# Patient Record
Sex: FEMALE | Race: WHITE | NOT HISPANIC OR LATINO | Employment: UNEMPLOYED | URBAN - METROPOLITAN AREA
[De-identification: names, ages, dates, MRNs, and addresses within clinical notes are randomized per-mention and may not be internally consistent; named-entity substitution may affect disease eponyms.]

---

## 2018-08-08 DIAGNOSIS — Z12.39 SCREENING BREAST EXAMINATION: Primary | ICD-10-CM

## 2018-10-05 ENCOUNTER — HOSPITAL ENCOUNTER (OUTPATIENT)
Dept: RADIOLOGY | Facility: HOSPITAL | Age: 48
Discharge: HOME | End: 2018-10-05
Attending: NURSE PRACTITIONER
Payer: COMMERCIAL

## 2018-10-05 DIAGNOSIS — Z12.39 SCREENING BREAST EXAMINATION: ICD-10-CM

## 2018-10-05 PROCEDURE — 77067 SCR MAMMO BI INCL CAD: CPT | Mod: 26,,, | Performed by: RADIOLOGY

## 2018-10-05 PROCEDURE — 77067 SCR MAMMO BI INCL CAD: CPT | Mod: TC

## 2019-04-04 DIAGNOSIS — R07.89 OTHER CHEST PAIN: ICD-10-CM

## 2019-04-04 DIAGNOSIS — R74.8 ABNORMAL LEVELS OF OTHER SERUM ENZYMES: Primary | ICD-10-CM

## 2019-09-09 ENCOUNTER — HOSPITAL ENCOUNTER (EMERGENCY)
Facility: HOSPITAL | Age: 49
Discharge: PSYCHIATRIC OR SUBSTANCE USE FACILITY | End: 2019-09-09
Attending: EMERGENCY MEDICINE
Payer: COMMERCIAL

## 2019-09-09 VITALS
HEIGHT: 64 IN | DIASTOLIC BLOOD PRESSURE: 82 MMHG | OXYGEN SATURATION: 99 % | BODY MASS INDEX: 28.17 KG/M2 | RESPIRATION RATE: 11 BRPM | SYSTOLIC BLOOD PRESSURE: 137 MMHG | TEMPERATURE: 98 F | WEIGHT: 165 LBS | HEART RATE: 78 BPM

## 2019-09-09 DIAGNOSIS — R53.1 WEAKNESS: ICD-10-CM

## 2019-09-09 DIAGNOSIS — E86.0 DEHYDRATION: Primary | ICD-10-CM

## 2019-09-09 LAB
ALBUMIN SERPL BCP-MCNC: 4.9 G/DL (ref 3.5–5.2)
ALP SERPL-CCNC: 167 U/L (ref 55–135)
ALT SERPL W/O P-5'-P-CCNC: 213 U/L (ref 10–44)
ANION GAP SERPL CALC-SCNC: 13 MMOL/L (ref 8–16)
AST SERPL-CCNC: 98 U/L (ref 10–40)
BACTERIA #/AREA URNS HPF: NORMAL /HPF
BASOPHILS # BLD AUTO: 0.04 K/UL (ref 0–0.2)
BASOPHILS NFR BLD: 0.5 % (ref 0–1.9)
BILIRUB SERPL-MCNC: 0.9 MG/DL (ref 0.1–1)
BILIRUB UR QL STRIP: NEGATIVE
BUN SERPL-MCNC: 14 MG/DL (ref 6–20)
CALCIUM SERPL-MCNC: 9.5 MG/DL (ref 8.7–10.5)
CHLORIDE SERPL-SCNC: 102 MMOL/L (ref 95–110)
CK SERPL-CCNC: 87 U/L (ref 20–180)
CLARITY UR: CLEAR
CO2 SERPL-SCNC: 26 MMOL/L (ref 23–29)
COLOR UR: YELLOW
CREAT SERPL-MCNC: 0.8 MG/DL (ref 0.5–1.4)
DIFFERENTIAL METHOD: ABNORMAL
EOSINOPHIL # BLD AUTO: 0.1 K/UL (ref 0–0.5)
EOSINOPHIL NFR BLD: 1.5 % (ref 0–8)
ERYTHROCYTE [DISTWIDTH] IN BLOOD BY AUTOMATED COUNT: 12.9 % (ref 11.5–14.5)
EST. GFR  (AFRICAN AMERICAN): >60 ML/MIN/1.73 M^2
EST. GFR  (NON AFRICAN AMERICAN): >60 ML/MIN/1.73 M^2
GLUCOSE SERPL-MCNC: 112 MG/DL (ref 70–110)
GLUCOSE UR QL STRIP: NEGATIVE
HCT VFR BLD AUTO: 41 % (ref 37–48.5)
HGB BLD-MCNC: 13.6 G/DL (ref 12–16)
HGB UR QL STRIP: NEGATIVE
KETONES UR QL STRIP: NEGATIVE
LEUKOCYTE ESTERASE UR QL STRIP: NEGATIVE
LYMPHOCYTES # BLD AUTO: 2.4 K/UL (ref 1–4.8)
LYMPHOCYTES NFR BLD: 30.7 % (ref 18–48)
MCH RBC QN AUTO: 32.3 PG (ref 27–31)
MCHC RBC AUTO-ENTMCNC: 33.2 G/DL (ref 32–36)
MCV RBC AUTO: 97 FL (ref 82–98)
MICROSCOPIC COMMENT: NORMAL
MONOCYTES # BLD AUTO: 0.5 K/UL (ref 0.3–1)
MONOCYTES NFR BLD: 5.9 % (ref 4–15)
NEUTROPHILS # BLD AUTO: 4.9 K/UL (ref 1.8–7.7)
NEUTROPHILS NFR BLD: 61.4 % (ref 38–73)
NITRITE UR QL STRIP: NEGATIVE
PH UR STRIP: 6 [PH] (ref 5–8)
PLATELET # BLD AUTO: 367 K/UL (ref 150–350)
PMV BLD AUTO: 9.3 FL (ref 9.2–12.9)
POTASSIUM SERPL-SCNC: 4.1 MMOL/L (ref 3.5–5.1)
PROT SERPL-MCNC: 8.4 G/DL (ref 6–8.4)
PROT UR QL STRIP: NEGATIVE
RBC # BLD AUTO: 4.21 M/UL (ref 4–5.4)
RBC #/AREA URNS HPF: 0 /HPF (ref 0–4)
SODIUM SERPL-SCNC: 141 MMOL/L (ref 136–145)
SP GR UR STRIP: 1.02 (ref 1–1.03)
TROPONIN I SERPL DL<=0.01 NG/ML-MCNC: <0.006 NG/ML (ref 0–0.03)
TSH SERPL DL<=0.005 MIU/L-ACNC: 0.95 UIU/ML (ref 0.4–4)
URN SPEC COLLECT METH UR: NORMAL
UROBILINOGEN UR STRIP-ACNC: NEGATIVE EU/DL
WBC # BLD AUTO: 7.94 K/UL (ref 3.9–12.7)
WBC #/AREA URNS HPF: 0 /HPF (ref 0–5)

## 2019-09-09 PROCEDURE — 93010 EKG 12-LEAD: ICD-10-PCS | Mod: ,,, | Performed by: INTERNAL MEDICINE

## 2019-09-09 PROCEDURE — 82550 ASSAY OF CK (CPK): CPT

## 2019-09-09 PROCEDURE — 93010 ELECTROCARDIOGRAM REPORT: CPT | Mod: ,,, | Performed by: INTERNAL MEDICINE

## 2019-09-09 PROCEDURE — 84484 ASSAY OF TROPONIN QUANT: CPT

## 2019-09-09 PROCEDURE — 63600175 PHARM REV CODE 636 W HCPCS: Performed by: EMERGENCY MEDICINE

## 2019-09-09 PROCEDURE — 81000 URINALYSIS NONAUTO W/SCOPE: CPT

## 2019-09-09 PROCEDURE — 99284 EMERGENCY DEPT VISIT MOD MDM: CPT | Mod: 25

## 2019-09-09 PROCEDURE — 85025 COMPLETE CBC W/AUTO DIFF WBC: CPT

## 2019-09-09 PROCEDURE — 84443 ASSAY THYROID STIM HORMONE: CPT

## 2019-09-09 PROCEDURE — 93005 ELECTROCARDIOGRAM TRACING: CPT

## 2019-09-09 PROCEDURE — 80053 COMPREHEN METABOLIC PANEL: CPT

## 2019-09-09 RX ADMIN — SODIUM CHLORIDE 1000 ML: 0.9 INJECTION, SOLUTION INTRAVENOUS at 04:09

## 2019-09-09 NOTE — ED PROVIDER NOTES
Encounter Date: 9/9/2019       History     Chief Complaint   Patient presents with    Fatigue     reports diarrhea Saturday; reports weakness and general fatigue; tingling to both arms; general bodyaches; sent from urgent care for /104 and for IV fluids; reports chest pain Saturday     48 y.o. female No past medical history on file.     Notes that she was outside at a very hot football game on Saturday and shortly after started to feel weak, sluggish, dehydrated, notes that she had 4 episodes of loose stools without blood but denies ongoing symptoms. She notes that she did have chest pain for a few min on Saturday which completely resolved with antacids. She was seen at an Urgent care where they redirected here for ivf due to dehydration. She endorses mild nausea.        Review of patient's allergies indicates:  No Known Allergies  No past medical history on file.  Past Surgical History:   Procedure Laterality Date    HYSTERECTOMY      OOPHORECTOMY       No family history on file.  Social History     Tobacco Use    Smoking status: Not on file   Substance Use Topics    Alcohol use: Not on file    Drug use: Not on file     Review of Systems   Constitutional: Negative for fever.   HENT: Negative for sore throat.    Respiratory: Negative for shortness of breath.    Cardiovascular: Negative for chest pain.   Gastrointestinal: Negative for nausea.   Genitourinary: Negative for dysuria.   Musculoskeletal: Negative for back pain.   Skin: Negative for rash.   Neurological: Negative for weakness.   Hematological: Does not bruise/bleed easily.   All other systems reviewed and are negative.      Physical Exam     Initial Vitals [09/09/19 1622]   BP Pulse Resp Temp SpO2   (!) 159/89 (!) 111 20 98.4 °F (36.9 °C) 100 %      MAP       --         Physical Exam    Nursing note and vitals reviewed.  Constitutional: She appears well-developed and well-nourished.   HENT:   Head: Normocephalic and atraumatic.   Eyes:  Conjunctivae and EOM are normal. Pupils are equal, round, and reactive to light.   Neck: Normal range of motion.   Cardiovascular: Regular rhythm.   Pulmonary/Chest: No respiratory distress.   Abdominal: Soft. She exhibits no distension. There is no tenderness. There is no rebound and no guarding.   Musculoskeletal: Normal range of motion.   Neurological: She is alert. No cranial nerve deficit. GCS score is 15. GCS eye subscore is 4. GCS verbal subscore is 5. GCS motor subscore is 6.   Skin: Skin is warm and dry.   Psychiatric: She has a normal mood and affect. Thought content normal.     tachy no m    ED Course   Procedures  Labs Reviewed   CBC W/ AUTO DIFFERENTIAL   COMPREHENSIVE METABOLIC PANEL   URINALYSIS, REFLEX TO URINE CULTURE   TSH   TROPONIN I   CK   POCT URINE PREGNANCY     EKG Readings: (Independently Interpreted)   Hr 98, sinus, nl axis/intervals, twi III, avf, no stemi.        Imaging Results    None          Medical Decision Making:   Initial Assessment:   Dehydration: screening labs, ck, ivf                 pt feels much better after interventions.     Clinical Impression:       ICD-10-CM ICD-9-CM   1. Dehydration E86.0 276.51   2. Weakness R53.1 780.79                                Sabra Coates MD  09/09/19 8030

## 2019-09-09 NOTE — ED TRIAGE NOTES
Pt reports hot sweats for several days. Became over heated on Saturday and had one case of diarrhea. Pt reports weakness and fatigue since then. Pt is alert and oriented, ambulatory. Respirations even, unlabored.

## 2019-09-09 NOTE — DISCHARGE INSTRUCTIONS
Thank you for coming to our Emergency Department today. It is important to remember that some problems are difficult to diagnose and may not be found during your first visit. Be sure to follow up with your primary care doctor and review any labs/imaging that was performed with them. If you do not have a primary care doctor, you may contact the one listed on your discharge paperwork or you may also call the [facility] Appointment Desk at [phone] to schedule an appointment with one.     All medications may potentially have side effects and it is impossible to predict which medications may give you side effects. If you feel that you are having a negative effect of any medication you should immediately stop taking them and seek medical attention.    Return to the ER with any questions/concerns, new/concerning symptoms, worsening or failure to improve. Do not drive or make any important decisions for 24 hours if you have received any pain medications, sedatives or mood altering drugs during your ER visit.

## 2019-10-27 ENCOUNTER — HOSPITAL ENCOUNTER (INPATIENT)
Facility: HOSPITAL | Age: 49
LOS: 2 days | Discharge: HOME | DRG: 195 | End: 2019-10-29
Attending: EMERGENCY MEDICINE | Admitting: HOSPITALIST
Payer: COMMERCIAL

## 2019-10-27 DIAGNOSIS — J18.9 PNEUMONIA OF RIGHT MIDDLE LOBE DUE TO INFECTIOUS ORGANISM: Primary | ICD-10-CM

## 2019-10-27 DIAGNOSIS — R09.02 HYPOXIA: ICD-10-CM

## 2019-10-27 LAB
ALBUMIN SERPL BCP-MCNC: 4.4 G/DL (ref 3.5–5.2)
ALP SERPL-CCNC: 131 U/L (ref 55–135)
ALT SERPL W/O P-5'-P-CCNC: 109 U/L (ref 10–44)
ANION GAP SERPL CALC-SCNC: 12 MMOL/L (ref 8–16)
AST SERPL-CCNC: 93 U/L (ref 10–40)
BASOPHILS # BLD AUTO: 0.02 K/UL (ref 0–0.2)
BASOPHILS NFR BLD: 0.2 % (ref 0–1.9)
BILIRUB SERPL-MCNC: 0.4 MG/DL (ref 0.1–1)
BILIRUB UR QL STRIP: NEGATIVE
BUN SERPL-MCNC: 11 MG/DL (ref 6–20)
CALCIUM SERPL-MCNC: 9.3 MG/DL (ref 8.7–10.5)
CHLORIDE SERPL-SCNC: 106 MMOL/L (ref 95–110)
CLARITY UR: CLEAR
CO2 SERPL-SCNC: 26 MMOL/L (ref 23–29)
COLOR UR: NORMAL
CREAT SERPL-MCNC: 0.7 MG/DL (ref 0.5–1.4)
DIFFERENTIAL METHOD: ABNORMAL
EOSINOPHIL # BLD AUTO: 0 K/UL (ref 0–0.5)
EOSINOPHIL NFR BLD: 0.1 % (ref 0–8)
ERYTHROCYTE [DISTWIDTH] IN BLOOD BY AUTOMATED COUNT: 12.9 % (ref 11.5–14.5)
EST. GFR  (AFRICAN AMERICAN): >60 ML/MIN/1.73 M^2
EST. GFR  (NON AFRICAN AMERICAN): >60 ML/MIN/1.73 M^2
GLUCOSE SERPL-MCNC: 100 MG/DL (ref 70–110)
GLUCOSE UR QL STRIP: NEGATIVE
HCT VFR BLD AUTO: 39.6 % (ref 37–48.5)
HGB BLD-MCNC: 13.1 G/DL (ref 12–16)
HGB UR QL STRIP: NEGATIVE
IMM GRANULOCYTES # BLD AUTO: 0.13 K/UL (ref 0–0.04)
IMM GRANULOCYTES NFR BLD AUTO: 1.1 % (ref 0–0.5)
KETONES UR QL STRIP: NEGATIVE
LACTATE SERPL-SCNC: 1.7 MMOL/L (ref 0.5–2.2)
LACTATE SERPL-SCNC: 3.7 MMOL/L (ref 0.5–2.2)
LEUKOCYTE ESTERASE UR QL STRIP: NEGATIVE
LYMPHOCYTES # BLD AUTO: 2.6 K/UL (ref 1–4.8)
LYMPHOCYTES NFR BLD: 21.9 % (ref 18–48)
MCH RBC QN AUTO: 32.5 PG (ref 27–31)
MCHC RBC AUTO-ENTMCNC: 33.1 G/DL (ref 32–36)
MCV RBC AUTO: 98 FL (ref 82–98)
MONOCYTES # BLD AUTO: 0.6 K/UL (ref 0.3–1)
MONOCYTES NFR BLD: 5.1 % (ref 4–15)
NEUTROPHILS # BLD AUTO: 8.6 K/UL (ref 1.8–7.7)
NEUTROPHILS NFR BLD: 71.6 % (ref 38–73)
NITRITE UR QL STRIP: NEGATIVE
NRBC BLD-RTO: 0 /100 WBC
PH UR STRIP: 6 [PH] (ref 5–8)
PLATELET # BLD AUTO: 331 K/UL (ref 150–350)
PMV BLD AUTO: 9.6 FL (ref 9.2–12.9)
POTASSIUM SERPL-SCNC: 4.2 MMOL/L (ref 3.5–5.1)
PROT SERPL-MCNC: 7.8 G/DL (ref 6–8.4)
PROT UR QL STRIP: NEGATIVE
RBC # BLD AUTO: 4.03 M/UL (ref 4–5.4)
SODIUM SERPL-SCNC: 144 MMOL/L (ref 136–145)
SP GR UR STRIP: 1.01 (ref 1–1.03)
URN SPEC COLLECT METH UR: NORMAL
UROBILINOGEN UR STRIP-ACNC: NEGATIVE EU/DL
WBC # BLD AUTO: 12.03 K/UL (ref 3.9–12.7)

## 2019-10-27 PROCEDURE — 25000003 PHARM REV CODE 250: Performed by: PHYSICIAN ASSISTANT

## 2019-10-27 PROCEDURE — 99285 EMERGENCY DEPT VISIT HI MDM: CPT | Mod: 25

## 2019-10-27 PROCEDURE — 81003 URINALYSIS AUTO W/O SCOPE: CPT

## 2019-10-27 PROCEDURE — 80053 COMPREHEN METABOLIC PANEL: CPT

## 2019-10-27 PROCEDURE — 96365 THER/PROPH/DIAG IV INF INIT: CPT

## 2019-10-27 PROCEDURE — 63600175 PHARM REV CODE 636 W HCPCS: Performed by: PHYSICIAN ASSISTANT

## 2019-10-27 PROCEDURE — 12000002 HC ACUTE/MED SURGE SEMI-PRIVATE ROOM

## 2019-10-27 PROCEDURE — 87040 BLOOD CULTURE FOR BACTERIA: CPT | Mod: 59

## 2019-10-27 PROCEDURE — 96375 TX/PRO/DX INJ NEW DRUG ADDON: CPT

## 2019-10-27 PROCEDURE — 21400001 HC TELEMETRY ROOM

## 2019-10-27 PROCEDURE — 85025 COMPLETE CBC W/AUTO DIFF WBC: CPT

## 2019-10-27 PROCEDURE — 96361 HYDRATE IV INFUSION ADD-ON: CPT

## 2019-10-27 PROCEDURE — 83605 ASSAY OF LACTIC ACID: CPT | Mod: 91

## 2019-10-27 RX ORDER — HYDROCODONE BITARTRATE AND ACETAMINOPHEN 5; 325 MG/1; MG/1
1 TABLET ORAL EVERY 4 HOURS PRN
Status: DISCONTINUED | OUTPATIENT
Start: 2019-10-27 | End: 2019-10-29 | Stop reason: HOSPADM

## 2019-10-27 RX ORDER — AMOXICILLIN AND CLAVULANATE POTASSIUM 562.5; 437.5; 62.5 MG/1; MG/1; MG/1
2 TABLET, FILM COATED, EXTENDED RELEASE ORAL EVERY 12 HOURS
Qty: 28 TABLET | Refills: 0 | Status: SHIPPED | OUTPATIENT
Start: 2019-10-27 | End: 2019-10-28 | Stop reason: ALTCHOICE

## 2019-10-27 RX ORDER — ONDANSETRON 8 MG/1
8 TABLET, ORALLY DISINTEGRATING ORAL EVERY 8 HOURS PRN
Status: DISCONTINUED | OUTPATIENT
Start: 2019-10-27 | End: 2019-10-28

## 2019-10-27 RX ORDER — SODIUM CHLORIDE 0.9 % (FLUSH) 0.9 %
10 SYRINGE (ML) INJECTION
Status: DISCONTINUED | OUTPATIENT
Start: 2019-10-27 | End: 2019-10-29 | Stop reason: HOSPADM

## 2019-10-27 RX ORDER — MORPHINE SULFATE 10 MG/ML
4 INJECTION INTRAMUSCULAR; INTRAVENOUS; SUBCUTANEOUS EVERY 4 HOURS PRN
Status: DISCONTINUED | OUTPATIENT
Start: 2019-10-27 | End: 2019-10-29 | Stop reason: HOSPADM

## 2019-10-27 RX ORDER — ACETAMINOPHEN 325 MG/1
650 TABLET ORAL EVERY 8 HOURS PRN
Status: DISCONTINUED | OUTPATIENT
Start: 2019-10-27 | End: 2019-10-29 | Stop reason: HOSPADM

## 2019-10-27 RX ORDER — BENZONATATE 100 MG/1
200 CAPSULE ORAL ONCE
Status: COMPLETED | OUTPATIENT
Start: 2019-10-27 | End: 2019-10-27

## 2019-10-27 RX ORDER — LEVOFLOXACIN 5 MG/ML
750 INJECTION, SOLUTION INTRAVENOUS
Status: DISCONTINUED | OUTPATIENT
Start: 2019-10-28 | End: 2019-10-29 | Stop reason: HOSPADM

## 2019-10-27 RX ORDER — BENZONATATE 100 MG/1
200 CAPSULE ORAL ONCE
Status: DISCONTINUED | OUTPATIENT
Start: 2019-10-28 | End: 2019-10-27

## 2019-10-27 RX ADMIN — BENZONATATE 200 MG: 100 CAPSULE ORAL at 11:10

## 2019-10-27 RX ADMIN — SODIUM CHLORIDE 2244 ML: 0.9 INJECTION, SOLUTION INTRAVENOUS at 05:10

## 2019-10-27 RX ADMIN — AZITHROMYCIN MONOHYDRATE 500 MG: 500 INJECTION, POWDER, LYOPHILIZED, FOR SOLUTION INTRAVENOUS at 07:10

## 2019-10-27 RX ADMIN — CEFTRIAXONE 2 G: 2 INJECTION, SOLUTION INTRAVENOUS at 05:10

## 2019-10-27 NOTE — ED TRIAGE NOTES
"Patient reports being sent here from urgent care to be seen for "double pneumonia". Reports cough, chest congestion, subjective fever, sob that started approx 10 days ago. Seen in urgent care 3 days ago, started on steroid and abx. Denies urinary symptoms, n/v/d.   "

## 2019-10-27 NOTE — ED PROVIDER NOTES
"Encounter Date: 10/27/2019    SCRIBE #1 NOTE: I, Eli Stearns, am scribing for, and in the presence of,  Kamran Falk PA-C. I have scribed the entire note.       History     Chief Complaint   Patient presents with    Chest Congestion     " I went to urgent care for chest congestion adn they sen me here. They say I have double pneumonia. I have been getting shot and breathing treatments".      CC: chest congestion    HPI:  This is a 49 y.o. female with a PMHx of HTN (on metoprolol) who presents to the Emergency Department with a cc of chest congestion x9 days. Associated symptoms include shortness of breath, cough productive of green phlegm, fatigue, and chest pain elicited by coughing. She denies fever, chills, myalgias, nausea, vomiting, diarrhea, frequency, or dysuria. She denies worsening factors. She was seen at Urgent care 2 days in a row for her symptoms. She was diagnosed with bilateral pneumonia per CXR, given a breathing treatment and Rocephin, and she was sent home with a prescription for Doxycycline. She reports compliance with the Doxycycline which she started at 2AM last night. She notes a PSHx including , hysterectomy, cholecystectomy, and ectopic pregnancy surgery.    The history is provided by the patient.     Review of patient's allergies indicates:  No Known Allergies  Past Medical History:   Diagnosis Date    Asthma     Hypertension      Past Surgical History:   Procedure Laterality Date    CHOLECYSTECTOMY      ECTOPIC PREGNANCY SURGERY      HYSTERECTOMY      OOPHORECTOMY       History reviewed. No pertinent family history.  Social History     Tobacco Use    Smoking status: Current Every Day Smoker     Packs/day: 0.50    Smokeless tobacco: Never Used   Substance Use Topics    Alcohol use: Never     Frequency: Never    Drug use: Not on file     Review of Systems   Constitutional: Positive for fatigue. Negative for chills and fever.   HENT: Negative for sore throat.  "   Respiratory: Positive for cough (productive) and shortness of breath.    Cardiovascular: Positive for chest pain.   Gastrointestinal: Negative for diarrhea, nausea and vomiting.   Genitourinary: Negative for dysuria and frequency.   Musculoskeletal: Negative for back pain and myalgias.   Skin: Negative for rash.   Neurological: Negative for weakness.   Hematological: Does not bruise/bleed easily.       Physical Exam     Initial Vitals [10/27/19 1424]   BP Pulse Resp Temp SpO2   (!) 181/109 100 18 98.1 °F (36.7 °C) 97 %      MAP       --         Physical Exam    Nursing note and vitals reviewed.  Constitutional: She appears well-developed and well-nourished. No distress.   HENT:   Head: Normocephalic and atraumatic.   Nose: Nose normal.   Mouth/Throat: Oropharynx is clear and moist.   Eyes: EOM are normal. Pupils are equal, round, and reactive to light.   Neck: Normal range of motion. Neck supple.   Cardiovascular: Normal rate, regular rhythm and normal heart sounds. Exam reveals no gallop and no friction rub.    No murmur heard.  Pulmonary/Chest: Effort normal. She has rhonchi in the right upper field and the right middle field.   Abdominal: Soft. Bowel sounds are normal. There is no tenderness. There is no rebound and no guarding.   Musculoskeletal: Normal range of motion.   Neurological: She is alert and oriented to person, place, and time. She has normal strength. No cranial nerve deficit or sensory deficit.   Skin: Skin is warm and dry. Capillary refill takes less than 2 seconds.   Psychiatric: She has a normal mood and affect.         ED Course   Procedures  Labs Reviewed   CBC W/ AUTO DIFFERENTIAL - Abnormal; Notable for the following components:       Result Value    Mean Corpuscular Hemoglobin 32.5 (*)     Immature Granulocytes 1.1 (*)     Gran # (ANC) 8.6 (*)     Immature Grans (Abs) 0.13 (*)     All other components within normal limits   COMPREHENSIVE METABOLIC PANEL - Abnormal; Notable for the  following components:    AST 93 (*)      (*)     All other components within normal limits   LACTIC ACID, PLASMA - Abnormal; Notable for the following components:    Lactate (Lactic Acid) 3.7 (*)     All other components within normal limits    Narrative:     LACTIC ACID   critical result(s) called and verbal readback obtained   from MIKE ANAYA., 10/27/2019 16:31   CULTURE, BLOOD   CULTURE, BLOOD   URINALYSIS, REFLEX TO URINE CULTURE    Narrative:     Preferred Collection Type->Urine, Clean Catch   LACTIC ACID, PLASMA          Imaging Results           X-Ray Chest PA And Lateral (Final result)  Result time 10/27/19 16:05:12    Final result by Kelechi Alford MD (10/27/19 16:05:12)                 Impression:      Bibasilar platelike scarring versus atelectasis with findings concerning for right middle lobe airspace disease, including pneumonia in this patient with history of cough.  Short-term follow-up chest radiography after therapy is recommended to resolution.    This report was flagged in Epic as abnormal.      Electronically signed by: Kelechi Alford MD  Date:    10/27/2019  Time:    16:05             Narrative:    EXAMINATION:  XR CHEST PA AND LATERAL    CLINICAL HISTORY:  Cough;    TECHNIQUE:  PA and lateral views of the chest were performed.    COMPARISON:  Chest radiograph 02/27/2013    FINDINGS:  Cardiomediastinal silhouette is midline and within normal limits.  Pulmonary vasculature and hilar regions are within normal limits.    Interval mild nonspecific elevation of right hemidiaphragm.  Bibasilar bandlike opacities consistent with platelike scarring versus atelectasis.  Subtle opacity projected at the lateral right middle lobe.  No pleural effusion or pneumothorax.  Osseous structures are intact.                                 Medical Decision Making:   Clinical Tests:   Lab Tests: Ordered and Reviewed  Radiological Study: Ordered and Reviewed  ED Management:  Hemodynamically stable on arrival  to ED. Non-toxic and in no acute distress. Labs, CXR ordered. CXR read reports bibasilar platelike scarring versus atelectasis with findings concerning for right middle lobe airspace disease, including pneumonia. Lactate elevated at 3.7. Rocephin and azithromycin ordered. Sepsis bolus NS ordered. Pt maintaining spO2 >94% on room air, afebrile and curb65 score of 0.  Discussed patient with Hospital Medicine and recommend repeating lactic acid and if normal discharging with addition of augmentin to medication regimen and continue outpatient treatment of pneumonia. Repeat lactate 1.7. Discussed discharge plan with patient who was agreeable with plan, but oxygen saturation started dropping to 88% on room air and began complaining of worsening dyspnea.. Pt placed on supplemental O2 and spO2 improved. Will bring patient in for failed outpatient treatment of pneumonia with hypoxia. Discussed patient with Hospital Medicine and patient admitted to inpatient medicine for further treatment and management.             Scribe Attestation:   Scribe #1: I performed the above scribed service and the documentation accurately describes the services I performed. I attest to the accuracy of the note.               Clinical Impression:     1. Pneumonia of right middle lobe due to infectious organism    2. Hypoxia            Disposition:   Disposition: Admitted  Condition: Stable    Scribe attestation: I, Kamran Falk, personally performed the services described in this documentation. All medical record entries made by the scribe were at my direction and in my presence.  I have reviewed the chart and agree that the record reflects my personal performance and is accurate and complete.                      Kamran Falk PA-C  10/28/19 0132

## 2019-10-27 NOTE — ED NOTES
Lab on unit informed of blood cultures to be drawn informed will return after drawing labs on another patient

## 2019-10-28 PROBLEM — J45.909 ASTHMA: Status: ACTIVE | Noted: 2019-10-28

## 2019-10-28 PROBLEM — I10 ESSENTIAL HYPERTENSION: Status: ACTIVE | Noted: 2019-10-28

## 2019-10-28 LAB — PROCALCITONIN SERPL IA-MCNC: 0.03 NG/ML

## 2019-10-28 PROCEDURE — S4991 NICOTINE PATCH NONLEGEND: HCPCS | Performed by: HOSPITALIST

## 2019-10-28 PROCEDURE — 63600175 PHARM REV CODE 636 W HCPCS: Performed by: HOSPITALIST

## 2019-10-28 PROCEDURE — 25000242 PHARM REV CODE 250 ALT 637 W/ HCPCS: Performed by: HOSPITALIST

## 2019-10-28 PROCEDURE — 36415 COLL VENOUS BLD VENIPUNCTURE: CPT

## 2019-10-28 PROCEDURE — 63600175 PHARM REV CODE 636 W HCPCS: Performed by: PHYSICIAN ASSISTANT

## 2019-10-28 PROCEDURE — 99900035 HC TECH TIME PER 15 MIN (STAT)

## 2019-10-28 PROCEDURE — 94640 AIRWAY INHALATION TREATMENT: CPT

## 2019-10-28 PROCEDURE — 94761 N-INVAS EAR/PLS OXIMETRY MLT: CPT

## 2019-10-28 PROCEDURE — 94799 UNLISTED PULMONARY SVC/PX: CPT

## 2019-10-28 PROCEDURE — 21400001 HC TELEMETRY ROOM

## 2019-10-28 PROCEDURE — 25000003 PHARM REV CODE 250: Performed by: HOSPITALIST

## 2019-10-28 PROCEDURE — 84145 PROCALCITONIN (PCT): CPT

## 2019-10-28 RX ORDER — METOPROLOL TARTRATE 1 MG/ML
5 INJECTION, SOLUTION INTRAVENOUS EVERY 5 MIN PRN
Status: DISCONTINUED | OUTPATIENT
Start: 2019-10-28 | End: 2019-10-29

## 2019-10-28 RX ORDER — ONDANSETRON 2 MG/ML
8 INJECTION INTRAMUSCULAR; INTRAVENOUS EVERY 8 HOURS PRN
Status: DISCONTINUED | OUTPATIENT
Start: 2019-10-28 | End: 2019-10-29 | Stop reason: HOSPADM

## 2019-10-28 RX ORDER — IPRATROPIUM BROMIDE AND ALBUTEROL SULFATE 2.5; .5 MG/3ML; MG/3ML
3 SOLUTION RESPIRATORY (INHALATION) EVERY 6 HOURS
Status: DISCONTINUED | OUTPATIENT
Start: 2019-10-28 | End: 2019-10-29 | Stop reason: HOSPADM

## 2019-10-28 RX ORDER — IBUPROFEN 200 MG
1 TABLET ORAL DAILY
Status: DISCONTINUED | OUTPATIENT
Start: 2019-10-28 | End: 2019-10-29 | Stop reason: HOSPADM

## 2019-10-28 RX ORDER — GUAIFENESIN/DEXTROMETHORPHAN 100-10MG/5
10 SYRUP ORAL EVERY 6 HOURS
Status: DISCONTINUED | OUTPATIENT
Start: 2019-10-28 | End: 2019-10-29 | Stop reason: HOSPADM

## 2019-10-28 RX ORDER — HYDRALAZINE HYDROCHLORIDE 20 MG/ML
10 INJECTION INTRAMUSCULAR; INTRAVENOUS EVERY 6 HOURS PRN
Status: DISCONTINUED | OUTPATIENT
Start: 2019-10-28 | End: 2019-10-29

## 2019-10-28 RX ORDER — BENZONATATE 100 MG/1
200 CAPSULE ORAL 3 TIMES DAILY
Status: DISCONTINUED | OUTPATIENT
Start: 2019-10-28 | End: 2019-10-29 | Stop reason: HOSPADM

## 2019-10-28 RX ADMIN — ACETAMINOPHEN 650 MG: 325 TABLET, FILM COATED ORAL at 03:10

## 2019-10-28 RX ADMIN — BENZONATATE 200 MG: 100 CAPSULE ORAL at 03:10

## 2019-10-28 RX ADMIN — IPRATROPIUM BROMIDE AND ALBUTEROL SULFATE 3 ML: .5; 3 SOLUTION RESPIRATORY (INHALATION) at 12:10

## 2019-10-28 RX ADMIN — IPRATROPIUM BROMIDE AND ALBUTEROL SULFATE 3 ML: .5; 3 SOLUTION RESPIRATORY (INHALATION) at 07:10

## 2019-10-28 RX ADMIN — GUAIFENESIN AND DEXTROMETHORPHAN 10 ML: 100; 10 SYRUP ORAL at 02:10

## 2019-10-28 RX ADMIN — GUAIFENESIN AND DEXTROMETHORPHAN 10 ML: 100; 10 SYRUP ORAL at 12:10

## 2019-10-28 RX ADMIN — HYDROCODONE BITARTRATE AND ACETAMINOPHEN 1 TABLET: 5; 325 TABLET ORAL at 07:10

## 2019-10-28 RX ADMIN — NICOTINE 1 PATCH: 21 PATCH, EXTENDED RELEASE TRANSDERMAL at 09:10

## 2019-10-28 RX ADMIN — LEVOFLOXACIN 750 MG: 750 INJECTION, SOLUTION INTRAVENOUS at 11:10

## 2019-10-28 RX ADMIN — BENZONATATE 200 MG: 100 CAPSULE ORAL at 02:10

## 2019-10-28 RX ADMIN — GUAIFENESIN AND DEXTROMETHORPHAN 10 ML: 100; 10 SYRUP ORAL at 05:10

## 2019-10-28 RX ADMIN — BENZONATATE 200 MG: 100 CAPSULE ORAL at 08:10

## 2019-10-28 RX ADMIN — BENZONATATE 200 MG: 100 CAPSULE ORAL at 09:10

## 2019-10-28 RX ADMIN — LEVOFLOXACIN 750 MG: 750 INJECTION, SOLUTION INTRAVENOUS at 12:10

## 2019-10-28 RX ADMIN — IPRATROPIUM BROMIDE AND ALBUTEROL SULFATE 3 ML: .5; 3 SOLUTION RESPIRATORY (INHALATION) at 08:10

## 2019-10-28 RX ADMIN — GUAIFENESIN AND DEXTROMETHORPHAN 10 ML: 100; 10 SYRUP ORAL at 11:10

## 2019-10-28 RX ADMIN — HYDRALAZINE HYDROCHLORIDE 10 MG: 20 INJECTION INTRAMUSCULAR; INTRAVENOUS at 08:10

## 2019-10-28 NOTE — NURSING
10/27 22:43 Received report from CONNIE Morales. Patient in ED.      1/27 23:43 Patient arrived in the [place] via wheelchair, accompanied by her mother. No pain. No sign of distress. Patient incontinent of urine when she arrived in the unit due to too much coughing, hospital gown provided and small pads. On room air. Patient walking around the room on arrival without help. Coughs on and off. O2 saturation 99% on room air. Call bell given on her reach, instructed to call for assistance as needed. Non skid socks on. Bed on lowest position. Safety maintained.         Patient stated that she takes inhaler as needed and once a day breathing treatment for 3 days. Noted expiratory wheezes. Dr. Nichols informed.

## 2019-10-28 NOTE — NURSING
Report Recieved from off going nurse Scarlett ROSALES. Patient is AAO, ambulated to Bathroom fine. Bed in lowest position, wheels locked, call light in reach. IV clean, dry and intact.

## 2019-10-28 NOTE — PLAN OF CARE
"To patient's room to discuss patient managing her care at home.      TN Role Explained.  Patient identified by using 2 identifiers:  Name and date of birth    Patient stated that Mom  WILL HELP AT HOME WITH her RECOVERY.       TN reviewed with patient contents of "[facility]".      TN name and contact info placed on the communication board    Preferred Pharmacy:  [facility]    Preferred appointment time:  No preferrence     10/28/19 0953   Discharge Assessment   Assessment Type Discharge Planning Assessment   Confirmed/corrected address and phone number on facesheet? Yes   Assessment information obtained from? Patient   Expected Length of Stay (days) 3   Communicated expected length of stay with patient/caregiver yes   Prior to hospitilization cognitive status: Alert/Oriented   Prior to hospitalization functional status: Independent   Current cognitive status: Alert/Oriented   Current Functional Status: Independent   Lives With child(sudhir), dependent   Able to Return to Prior Arrangements yes   Is patient able to care for self after discharge? Yes   Patient's perception of discharge disposition home or selfcare   Readmission Within the Last 30 Days no previous admission in last 30 days   Patient currently being followed by outpatient case management? No   Patient currently receives any other outside agency services? No   Equipment Currently Used at Home none   Do you have any problems affording any of your prescribed medications? No   Is the patient taking medications as prescribed? yes   Does the patient have transportation home? Yes   Transportation Anticipated family or friend will provide   Does the patient receive services at the [facility]? No   Discharge Plan A Home   Discharge Plan B   (n/a)   DME Needed Upon Discharge  none   Patient/Family in Agreement with Plan yes     "

## 2019-10-28 NOTE — PLAN OF CARE
Problem: Adult Inpatient Plan of Care  Goal: Absence of Hospital-Acquired Illness or Injury  Intervention: Identify and Manage Fall Risk  Flowsheets (Taken 10/28/2019 0423)  Safety Promotion/Fall Prevention: assistive device/personal item within reach; side rails raised x 2; instructed to call staff for mobility     Problem: Adult Inpatient Plan of Care  Goal: Absence of Hospital-Acquired Illness or Injury  Intervention: Prevent VTE (venous thromboembolism)  Flowsheets (Taken 10/28/2019 0423)  VTE Prevention/Management: ambulation promoted

## 2019-10-28 NOTE — PLAN OF CARE
10/28/19 0951   Discharge Assessment   Assessment Type Discharge Planning Assessment   Patient off unit.  TN to follow up at a later time.

## 2019-10-28 NOTE — PLAN OF CARE
LORELEI contacted Dr. Tariq Vargas office @ [phone] to schedule hospital follow-up appointment. LORELEI spoke with Debbie, appointment scheduled and placed in follow-up tab.     10/28/19 1377   Post-Acute Status   Post-Acute Authorization Other   Other Status See Comments  (Hospital Follow Up)   Discharge Delays None known at this time

## 2019-10-28 NOTE — H&P
"[facility] Hospital Medicine  History & Physical    Patient Name: Kosta Seymour  MRN: [id number]  Admission Date: 10/28/2019  Attending Physician: Kamran Nichols MD, MPH      PCP:     Tariq Vargas III, MD    CC:     Chief Complaint   Patient presents with    Chest Congestion     " I went to urgent care for chest congestion adn they sen me here. They say I have double pneumonia. I have been getting shot and breathing treatments".        HISTORY OF PRESENT ILLNESS:     Kosta Seymour is a 49 y.o. female that (in part)  has a past medical history of Asthma and Hypertension.  has a past surgical history that includes Hysterectomy; Oophorectomy; Cholecystectomy; and Ectopic pregnancy surgery. Presents to [facility] Emergency Department complaining of shortness of breath.  Patient was evaluated in urgent care and diagnosed with pneumonia.  She was sent home with doxycycline he did not show improvement.  She came back for further evaluation.       Description of symptoms    Location:  Chest/ lungs  Onset:  Subacute with progressive worsening  Character:  Moderate to severe shortness of breath and dyspnea with exertion  Frequency:  Daily  Duration:  Constant since onset  Associated Symptoms:  Fever, chills, cough, chest pain, wheezing  Radiation:  Throughout chest  Exacerbating factors:  Worsened with exertion  Relieving factors:  Some relief with supplemental oxygen.  No relief with doxycycline    .    In the emergency department repeat chest x-ray revealed evidence of right-sided middle lobe pneumonia.  Initially given ceftriaxone and azithromycin, however patient became flushed with the azithromycin.  Regimen switched to Levaquin.    Hospital medicine has been asked to admit for further evaluation and treatment.       REVIEW OF SYSTEMS:     -- Constitutional:  Subjective fever at home.  Generalized malaise.  -- Eyes: No visual changes, diplopia, pain, " tearing, blind spots, or discharge.   -- Ears, nose, mouth, throat, and face:  Congestion, sore throat.  epistaxis, d/c, bleeding gums, neck stiffness masses, or dental issues.  -- Respiratory:  As above in the HPI.  -- Cardiovascular:  Dyspnea with exertion.  Chest pain with cough.  Denies syncope, PND, edema, cyanosis, or palpitations.   -- Gastrointestinal: No vomiting, abdominal pain, hematemesis, melena, dyspepsia, or change in bowel habits.  -- Genitourinary: No hematuria, dysuria, frequency, urgency, nocturia, polyuria, stones, or incontinence.  -- Integument/breast: No rash, pruritis, pigmentation changes, dryness, or changes in hair  -- Hematologic/lymphatic: No easy bruising or lymphadenopathy.   -- Musculoskeletal: No acute arthralgias, acute myalgias, joint swelling, acute limitations of ROM, or acute muscular weakness.  -- Neurological: No seizures, headaches, incoordination, paraesthesias, ataxia, vertigo, or tremors.  -- Behavioral/Psych: No auditory or visual hallucinations, depression, or suicidal/homicidal ideations.  -- Endocrine: No heat or cold intolerance, polydipsia, or unintentional weight gain / loss.  -- Allergy/Immunologic: No recurrent infections or adverse reaction to food, insects, or difficulty breathing.      PAST MEDICAL / SURGICAL HISTORY:     Past Medical History:   Diagnosis Date    Asthma     Hypertension      Past Surgical History:   Procedure Laterality Date    CHOLECYSTECTOMY      ECTOPIC PREGNANCY SURGERY      HYSTERECTOMY      OOPHORECTOMY           FAMILY HISTORY:     Family history of cardiovascular disease    SOCIAL HISTORY:     Social History     Socioeconomic History    Marital status: Legally      Spouse name: Not on file    Number of children: Not on file    Years of education: Not on file    Highest education level: Not on file   Occupational History    Not on file   Social Needs    Financial resource strain: Not on file    Food insecurity:      Worry: Not on file     Inability: Not on file    Transportation needs:     Medical: Not on file     Non-medical: Not on file   Tobacco Use    Smoking status: Current Every Day Smoker     Packs/day: 0.50    Smokeless tobacco: Never Used   Substance and Sexual Activity    Alcohol use: Never     Frequency: Never    Drug use: Not on file    Sexual activity: Not Currently   Lifestyle    Physical activity:     Days per week: Not on file     Minutes per session: Not on file    Stress: Not on file   Relationships    Social connections:     Talks on phone: Not on file     Gets together: Not on file     Attends Taoism service: Not on file     Active member of club or organization: Not on file     Attends meetings of clubs or organizations: Not on file     Relationship status: Not on file   Other Topics Concern    Not on file   Social History Narrative    Not on file         ALLERGIES:       Review of patient's allergies indicates:  No Known Allergies      HOME MEDICATIONS:     Prior to Admission medications    Medication Sig Start Date End Date Taking? Authorizing Provider   amoxicillin-clavulanate 1,000-62.5 mg (AUGMENTIN XR) 1,000-62.5 mg per tablet Take 2 tablets by mouth every 12 (twelve) hours. for 7 days 10/27/19 10/28/19  Kamran Falk PA-C          [facility] MEDICATIONS:     Scheduled Meds:    albuterol-ipratropium  3 mL Nebulization Q6H    benzonatate  200 mg Oral TID    dextromethorphan-guaifenesin  mg/5 ml  10 mL Oral Q6H    levoFLOXacin  750 mg Intravenous Q24H    nicotine  1 patch Transdermal Daily     Continuous Infusions:   PRN Meds: acetaminophen, acetaminophen, hydrALAZINE, HYDROcodone-acetaminophen, metoprolol, morphine, ondansetron, pneumoc 13-isaias conj-dip cr(PF), promethazine (PHENERGAN) IVPB, sodium chloride 0.9%      PHYSICAL EXAM:     Wt Readings from Last 1 Encounters:   10/28/19 0018 77.8 kg (171 lb 8.3 oz)   10/27/19 1424 74.8 kg (165 lb)     Body mass index is 29.44  "kg/m².  Vitals:    10/27/19 2100 10/27/19 2331 10/28/19 0018 10/28/19 0343   BP: (!) 184/92 (!) 176/92 (!) 142/102 (!) 149/75   BP Location: Left arm Left arm Left arm Left arm   Patient Position: Sitting Sitting Lying Lying   Pulse: 73 70 78 73   Resp: 18 18 17 18   Temp: 98 °F (36.7 °C) 97.6 °F (36.4 °C) 98.6 °F (37 °C) 97.9 °F (36.6 °C)   TempSrc: Oral Oral  Oral   SpO2: 97% 97% 95% 100%   Weight:   77.8 kg (171 lb 8.3 oz)    Height:   5' 4" (1.626 m)           -- General appearance: well developed. appears stated age   -- Head: normocephalic, atraumatic   -- Eyes: conjunctivae clear. Extraocular muscles intact  -- Nose: Nares normal. Septum midline.   -- Mouth/Throat: lips, mucosa, and tongue normal. no throat erythema.   -- Neck: supple, symmetrical, trachea midline, no JVD and thyroid not grossly enlarged, appears symmetric  -- Lungs:  Rales rhonchi and mild expiratory wheezing in right middle posterior lung field. normal respiratory effort. No use of accessory muscles.   -- Chest wall: no tenderness. equal bilateral chest rise   -- Heart: regular rate and regular rhythm. S1, S2 normal.  no click, rub or gallop   -- Abdomen: soft, non-tender, non-distended, non-tympanic; bowel sounds normal; no masses  -- Extremities: no cyanosis, clubbing or edema.   -- Pulses: 2+ and symmetric   -- Skin:  Turgor normal. Color normal. Texture normal. No rashes or lesions.   -- Neurologic: Normal strength and tone. No focal numbness or weakness. CNII-XII intact. [place] coma scale: eyes open spontaneously-4, oriented & converses-5, obeys commands-6.      LABORATORY STUDIES:     Recent Results (from the past 36 hour(s))   Urinalysis, Reflex to Urine Culture Urine, Clean Catch    Collection Time: 10/27/19  3:30 PM   Result Value Ref Range    Specimen UA Urine, Clean Catch     Color, UA Straw Yellow, Straw, Alice    Appearance, UA Clear Clear    pH, UA 6.0 5.0 - 8.0    Specific Gravity, UA 1.010 1.005 - 1.030    Protein, UA " Negative Negative    Glucose, UA Negative Negative    Ketones, UA Negative Negative    Bilirubin (UA) Negative Negative    Occult Blood UA Negative Negative    Nitrite, UA Negative Negative    Urobilinogen, UA Negative <2.0 EU/dL    Leukocytes, UA Negative Negative   CBC auto differential    Collection Time: 10/27/19  3:35 PM   Result Value Ref Range    WBC 12.03 3.90 - 12.70 K/uL    RBC 4.03 4.00 - 5.40 M/uL    Hemoglobin 13.1 12.0 - 16.0 g/dL    Hematocrit 39.6 37.0 - 48.5 %    Mean Corpuscular Volume 98 82 - 98 fL    Mean Corpuscular Hemoglobin 32.5 (H) 27.0 - 31.0 pg    Mean Corpuscular Hemoglobin Conc 33.1 32.0 - 36.0 g/dL    RDW 12.9 11.5 - 14.5 %    Platelets 331 150 - 350 K/uL    MPV 9.6 9.2 - 12.9 fL    Immature Granulocytes 1.1 (H) 0.0 - 0.5 %    Gran # (ANC) 8.6 (H) 1.8 - 7.7 K/uL    Immature Grans (Abs) 0.13 (H) 0.00 - 0.04 K/uL    Lymph # 2.6 1.0 - 4.8 K/uL    Mono # 0.6 0.3 - 1.0 K/uL    Eos # 0.0 0.0 - 0.5 K/uL    Baso # 0.02 0.00 - 0.20 K/uL    nRBC 0 0 /100 WBC    Gran% 71.6 38.0 - 73.0 %    Lymph% 21.9 18.0 - 48.0 %    Mono% 5.1 4.0 - 15.0 %    Eosinophil% 0.1 0.0 - 8.0 %    Basophil% 0.2 0.0 - 1.9 %    Differential Method Automated    Comprehensive metabolic panel    Collection Time: 10/27/19  3:35 PM   Result Value Ref Range    Sodium 144 136 - 145 mmol/L    Potassium 4.2 3.5 - 5.1 mmol/L    Chloride 106 95 - 110 mmol/L    CO2 26 23 - 29 mmol/L    Glucose 100 70 - 110 mg/dL    BUN, Bld 11 6 - 20 mg/dL    Creatinine 0.7 0.5 - 1.4 mg/dL    Calcium 9.3 8.7 - 10.5 mg/dL    Total Protein 7.8 6.0 - 8.4 g/dL    Albumin 4.4 3.5 - 5.2 g/dL    Total Bilirubin 0.4 0.1 - 1.0 mg/dL    Alkaline Phosphatase 131 55 - 135 U/L    AST 93 (H) 10 - 40 U/L     (H) 10 - 44 U/L    Anion Gap 12 8 - 16 mmol/L    eGFR if African American >60 >60 mL/min/1.73 m^2    eGFR if non African American >60 >60 mL/min/1.73 m^2   Lactic acid, plasma    Collection Time: 10/27/19  3:35 PM   Result Value Ref Range    Lactate  (Lactic Acid) 3.7 (HH) 0.5 - 2.2 mmol/L   Blood culture #1    Collection Time: 10/27/19  4:34 PM   Result Value Ref Range    Blood Culture, Routine No Growth to date    Blood culture #2    Collection Time: 10/27/19  4:38 PM   Result Value Ref Range    Blood Culture, Routine No Growth to date    Lactic acid, plasma    Collection Time: 10/27/19  4:38 PM   Result Value Ref Range    Lactate (Lactic Acid) 1.7 0.5 - 2.2 mmol/L       Lab Results   Component Value Date    INR 0.9 07/24/2012    INR 0.9 04/08/2011     No results found for: HGBA1C  No results for input(s): POCTGLUCOSE in the last 72 hours.        MICROBIOLOGY DATA:     No results found for: LABGENI, LABREFE, LABUPPE, LABURIN, LABAFB    Microbiology x 7d:   Microbiology Results (last 7 days)     Procedure Component Value Units Date/Time    Blood culture #1 [[id number]] Collected:  10/27/19 1634    Order Status:  Completed Specimen:  Blood from Peripheral, Hand, Left Updated:  10/27/19 2312     Blood Culture, Routine No Growth to date    Blood culture #2 [[id number]] Collected:  10/27/19 1638    Order Status:  Completed Specimen:  Blood from Peripheral, Hand, Right Updated:  10/27/19 2312     Blood Culture, Routine No Growth to date            IMAGING:     Imaging Results           X-Ray Chest PA And Lateral (Final result)  Result time 10/27/19 16:05:12    Final result by Kelechi Alford MD (10/27/19 16:05:12)                 Impression:      Bibasilar platelike scarring versus atelectasis with findings concerning for right middle lobe airspace disease, including pneumonia in this patient with history of cough.  Short-term follow-up chest radiography after therapy is recommended to resolution.    This report was flagged in Epic as abnormal.      Electronically signed by: Kelechi Alford MD  Date:    10/27/2019  Time:    16:05             Narrative:    EXAMINATION:  XR CHEST PA AND LATERAL    CLINICAL HISTORY:  Cough;    TECHNIQUE:  PA and lateral views of the chest  were performed.    COMPARISON:  Chest radiograph 02/27/2013    FINDINGS:  Cardiomediastinal silhouette is midline and within normal limits.  Pulmonary vasculature and hilar regions are within normal limits.    Interval mild nonspecific elevation of right hemidiaphragm.  Bibasilar bandlike opacities consistent with platelike scarring versus atelectasis.  Subtle opacity projected at the lateral right middle lobe.  No pleural effusion or pneumothorax.  Osseous structures are intact.                                    ASSESSMENT & PLAN:     Primary Diagnosis:  Pneumonia of right middle lobe due to infectious organism    Active Hospital Problems    Diagnosis  POA    *Pneumonia of right middle lobe due to infectious organism [J18.1]  Yes     Priority: 1 - High    Essential hypertension [I10]  Yes    Asthma [J45.909]  Yes      Resolved Hospital Problems   No resolved problems to display.         Right middle lobe Pneumonia; refractory to outpatient therapy; history of asthma  · As evidence by history, physical exam, chest x-ray  · Right middle lobe opacification consistent with pneumonia  · 2 out of 4 SIRS criteria  · Initiate IV antibiotics for community-acquired pneumonia with Levaquin.  Patient had possible adverse reaction to azithromycin  · If clinical improvement is not seen by tomorrow, broaden antibiotic coverage, further tailored by sputum Gram stain and culture results  · Robitussin  · Tessalon Perles  · Incentive spirometry, aspiration precautions  · Consider chest physiotherapy as course dictates  · Nebulizer treatments scheduled  · If clinical improvement is not obtained with the treatment above consider ordering Upper Respiratory Panel TEM-PCR, PPD, quantiferon gold, histoplasma, blastomycosis urine antigens, aspergillus antigen, cryptococcus antigen, procalcitonin, and/or modified AFB.  · Pulmonology consult if no clinical improvement    Tobacco abuse   · Chronic tobacco use  · Tobacco cessation  counseling  · Nicotine patch offered; consider Wellbutrin or Chantix through PCP as an outpatient (will require closer monitoring)  · I discussed with the patient regarding the hazardous effects of smoking on increasing risk of heart attack and stroke, worsening lung functions, and increasing cancer risk.   Patient was urged to stop smoking now.  I also offered nicotine taper (such as nicotine patch and gum) to help ease the craving to smoke.      Hypertension  · Goal while inpatient is a systolic blood pressure less than 160mmHg  · BP in acceptable range at this time  · Continue current home regimen with hold parameters  · PRN antihypertensives available              VTE Risk Mitigation (From admission, onward)         Ordered     IP VTE LOW RISK PATIENT  Once      10/27/19 2349     Place DANIELE hose  Until discontinued      10/27/19 2349     Place sequential compression device  Until discontinued      10/27/19 2349                  Adult PRN medications available   DVT prophylaxis given       DISPOSITION:     Will admit to the Hospital Medicine service for further evaluation and treatment.    Chart reviewed and updated where applicable.    High Risk Conditions:  Patient has a condition that poses threat to life and bodily function: Severe Respiratory Distress      ===============================================================    Kamran Nichols MD, MPH  Department of Hospital Medicine   [facility]  [phone] pg  (7pm - 6am)          This note is dictated using Interval[employer] voice recognition software.  There are word recognition mistakes that are occasionally missed on review.

## 2019-10-29 VITALS
TEMPERATURE: 98 F | SYSTOLIC BLOOD PRESSURE: 132 MMHG | HEIGHT: 64 IN | OXYGEN SATURATION: 94 % | HEART RATE: 92 BPM | BODY MASS INDEX: 29.28 KG/M2 | DIASTOLIC BLOOD PRESSURE: 81 MMHG | RESPIRATION RATE: 18 BRPM | WEIGHT: 171.5 LBS

## 2019-10-29 LAB
ALBUMIN SERPL BCP-MCNC: 4 G/DL (ref 3.5–5.2)
ALP SERPL-CCNC: 134 U/L (ref 55–135)
ALT SERPL W/O P-5'-P-CCNC: 69 U/L (ref 10–44)
ANION GAP SERPL CALC-SCNC: 12 MMOL/L (ref 8–16)
AST SERPL-CCNC: 30 U/L (ref 10–40)
BASOPHILS # BLD AUTO: 0.06 K/UL (ref 0–0.2)
BASOPHILS NFR BLD: 0.6 % (ref 0–1.9)
BILIRUB SERPL-MCNC: 0.7 MG/DL (ref 0.1–1)
BUN SERPL-MCNC: 12 MG/DL (ref 6–20)
CALCIUM SERPL-MCNC: 8.9 MG/DL (ref 8.7–10.5)
CHLORIDE SERPL-SCNC: 105 MMOL/L (ref 95–110)
CO2 SERPL-SCNC: 24 MMOL/L (ref 23–29)
CREAT SERPL-MCNC: 0.7 MG/DL (ref 0.5–1.4)
DIFFERENTIAL METHOD: ABNORMAL
EOSINOPHIL # BLD AUTO: 0.1 K/UL (ref 0–0.5)
EOSINOPHIL NFR BLD: 0.9 % (ref 0–8)
ERYTHROCYTE [DISTWIDTH] IN BLOOD BY AUTOMATED COUNT: 12.5 % (ref 11.5–14.5)
EST. GFR  (AFRICAN AMERICAN): >60 ML/MIN/1.73 M^2
EST. GFR  (NON AFRICAN AMERICAN): >60 ML/MIN/1.73 M^2
GLUCOSE SERPL-MCNC: 108 MG/DL (ref 70–110)
HCT VFR BLD AUTO: 37.2 % (ref 37–48.5)
HGB BLD-MCNC: 12.6 G/DL (ref 12–16)
IMM GRANULOCYTES # BLD AUTO: 0.08 K/UL (ref 0–0.04)
IMM GRANULOCYTES NFR BLD AUTO: 0.8 % (ref 0–0.5)
LYMPHOCYTES # BLD AUTO: 2.6 K/UL (ref 1–4.8)
LYMPHOCYTES NFR BLD: 26.5 % (ref 18–48)
MAGNESIUM SERPL-MCNC: 2 MG/DL (ref 1.6–2.6)
MCH RBC QN AUTO: 32 PG (ref 27–31)
MCHC RBC AUTO-ENTMCNC: 33.9 G/DL (ref 32–36)
MCV RBC AUTO: 94 FL (ref 82–98)
MONOCYTES # BLD AUTO: 0.7 K/UL (ref 0.3–1)
MONOCYTES NFR BLD: 7 % (ref 4–15)
NEUTROPHILS # BLD AUTO: 6.2 K/UL (ref 1.8–7.7)
NEUTROPHILS NFR BLD: 64.2 % (ref 38–73)
NRBC BLD-RTO: 0 /100 WBC
PHOSPHATE SERPL-MCNC: 4.7 MG/DL (ref 2.7–4.5)
PLATELET # BLD AUTO: 317 K/UL (ref 150–350)
PMV BLD AUTO: 9.4 FL (ref 9.2–12.9)
POTASSIUM SERPL-SCNC: 3.8 MMOL/L (ref 3.5–5.1)
PROT SERPL-MCNC: 7 G/DL (ref 6–8.4)
RBC # BLD AUTO: 3.94 M/UL (ref 4–5.4)
SODIUM SERPL-SCNC: 141 MMOL/L (ref 136–145)
WBC # BLD AUTO: 9.7 K/UL (ref 3.9–12.7)

## 2019-10-29 PROCEDURE — 94761 N-INVAS EAR/PLS OXIMETRY MLT: CPT

## 2019-10-29 PROCEDURE — 25000003 PHARM REV CODE 250: Performed by: HOSPITALIST

## 2019-10-29 PROCEDURE — 25000242 PHARM REV CODE 250 ALT 637 W/ HCPCS: Performed by: HOSPITALIST

## 2019-10-29 PROCEDURE — 80053 COMPREHEN METABOLIC PANEL: CPT

## 2019-10-29 PROCEDURE — 36415 COLL VENOUS BLD VENIPUNCTURE: CPT

## 2019-10-29 PROCEDURE — 83735 ASSAY OF MAGNESIUM: CPT

## 2019-10-29 PROCEDURE — 84100 ASSAY OF PHOSPHORUS: CPT

## 2019-10-29 PROCEDURE — 85025 COMPLETE CBC W/AUTO DIFF WBC: CPT

## 2019-10-29 PROCEDURE — S4991 NICOTINE PATCH NONLEGEND: HCPCS | Performed by: HOSPITALIST

## 2019-10-29 PROCEDURE — 94640 AIRWAY INHALATION TREATMENT: CPT

## 2019-10-29 PROCEDURE — 94799 UNLISTED PULMONARY SVC/PX: CPT

## 2019-10-29 RX ORDER — METOPROLOL TARTRATE 25 MG/1
25 TABLET, FILM COATED ORAL 2 TIMES DAILY
Status: DISCONTINUED | OUTPATIENT
Start: 2019-10-29 | End: 2019-10-29 | Stop reason: HOSPADM

## 2019-10-29 RX ORDER — CLONIDINE HYDROCHLORIDE 0.1 MG/1
0.1 TABLET ORAL EVERY 8 HOURS PRN
Status: DISCONTINUED | OUTPATIENT
Start: 2019-10-29 | End: 2019-10-29 | Stop reason: HOSPADM

## 2019-10-29 RX ORDER — ALBUTEROL SULFATE 90 UG/1
2 AEROSOL, METERED RESPIRATORY (INHALATION) EVERY 6 HOURS PRN
Qty: 18 G | Refills: 0 | Status: SHIPPED | OUTPATIENT
Start: 2019-10-29 | End: 2020-10-28

## 2019-10-29 RX ORDER — LEVOFLOXACIN 750 MG/1
750 TABLET ORAL DAILY
Qty: 6 TABLET | Refills: 0 | Status: SHIPPED | OUTPATIENT
Start: 2019-10-29 | End: 2019-11-04

## 2019-10-29 RX ORDER — PREDNISONE 20 MG/1
40 TABLET ORAL DAILY
Qty: 6 TABLET | Refills: 0 | Status: SHIPPED | OUTPATIENT
Start: 2019-10-29 | End: 2019-11-01

## 2019-10-29 RX ORDER — ACETAMINOPHEN 325 MG/1
650 TABLET ORAL EVERY 4 HOURS PRN
Refills: 0 | COMMUNITY
Start: 2019-10-29

## 2019-10-29 RX ORDER — BENZONATATE 200 MG/1
200 CAPSULE ORAL 3 TIMES DAILY PRN
Qty: 30 CAPSULE | Refills: 0 | Status: SHIPPED | OUTPATIENT
Start: 2019-10-29 | End: 2019-11-08

## 2019-10-29 RX ADMIN — GUAIFENESIN AND DEXTROMETHORPHAN 10 ML: 100; 10 SYRUP ORAL at 11:10

## 2019-10-29 RX ADMIN — BENZONATATE 200 MG: 100 CAPSULE ORAL at 09:10

## 2019-10-29 RX ADMIN — METOPROLOL TARTRATE 25 MG: 25 TABLET ORAL at 09:10

## 2019-10-29 RX ADMIN — ACETAMINOPHEN 650 MG: 325 TABLET, FILM COATED ORAL at 12:10

## 2019-10-29 RX ADMIN — NICOTINE 1 PATCH: 21 PATCH, EXTENDED RELEASE TRANSDERMAL at 09:10

## 2019-10-29 RX ADMIN — IPRATROPIUM BROMIDE AND ALBUTEROL SULFATE 3 ML: .5; 3 SOLUTION RESPIRATORY (INHALATION) at 08:10

## 2019-10-29 RX ADMIN — GUAIFENESIN AND DEXTROMETHORPHAN 10 ML: 100; 10 SYRUP ORAL at 06:10

## 2019-10-29 NOTE — DISCHARGE SUMMARY
[facility] Hospital Medicine  Discharge Summary      Patient Name: Kosta Seymour  MRN: [id number]  Admission Date: 10/27/2019  Hospital Length of Stay: 2 days  Discharge Date and Time:  10/29/2019 10:39 AM  Attending Physician: Juan Madrigal MD   Discharging Provider: Juan Madrigal MD  Primary Care Provider: Tariq Vargas III, MD      HPI:   Kosta Seymour is a 49 y.o. female that (in part)  has a past medical history of Asthma and Hypertension.  has a past surgical history that includes Hysterectomy; Oophorectomy; Cholecystectomy; and Ectopic pregnancy surgery. Presents to [facility] Emergency Department complaining of shortness of breath.  Patient was evaluated in urgent care and diagnosed with pneumonia.  She was sent home with doxycycline he did not show improvement.  She came back for further evaluation.       Description of symptoms    Location:  Chest/ lungs  Onset:  Subacute with progressive worsening  Character:  Moderate to severe shortness of breath and dyspnea with exertion  Frequency:  Daily  Duration:  Constant since onset  Associated Symptoms:  Fever, chills, cough, chest pain, wheezing  Radiation:  Throughout chest  Exacerbating factors:  Worsened with exertion  Relieving factors:  Some relief with supplemental oxygen.  No relief with doxycycline    .    In the emergency department repeat chest x-ray revealed evidence of right-sided middle lobe pneumonia.  Initially given ceftriaxone and azithromycin, however patient became flushed with the azithromycin.  Regimen switched to Levaquin.    Hospital medicine has been asked to admit for further evaluation and treatment.     * No surgery found *      Hospital Course:   48 y/o female presents with congestion.  Patient recently seen at urgent care and diagnosed with pneumonia.  Treated with Doxycycline with no improvement of symptoms.  She presented to ER where she was noted to have elevated  lactic acid.  Patient also had episode of hypoxia and worsening shortness of breath.  Patient admitted and started on IV Levaquin with improvement of symptoms.  Patient with hx of mild intermittent asthma.  She was also started on nebs.  Patient has remained afebrile and hemodynamically stable.  Will discharged home on 6 more days of oral Levaquin.  Will give a short course of Prednisone upon discharge.  Patient to follow up with PCP.     Consults:     No new Assessment & Plan notes have been filed under this hospital service since the last note was generated.  Service: Hospital Medicine    Final Active Diagnoses:    Diagnosis Date Noted POA    PRINCIPAL PROBLEM:  Pneumonia of right middle lobe due to infectious organism [J18.1] 10/27/2019 Yes    Essential hypertension [I10] 10/28/2019 Yes    Asthma [J45.909] 10/28/2019 Yes      Problems Resolved During this Admission:       Discharged Condition: stable    Disposition: Home or Self Care    Follow Up:  Follow-up Information     Tariq Vargas III, MD. Schedule an appointment as soon as possible for a visit on 10/30/2019.    Specialty:  Internal Medicine  Why:  Outpatient Services, hospital follow up appointment on Monday 9:30   Contact information:  [address]  [place] [facility]  [place] [address]  [phone]                 Patient Instructions:      Diet Cardiac     Notify your health care provider if you experience any of the following:  temperature >100.4     Notify your health care provider if you experience any of the following:  persistent nausea and vomiting or diarrhea     Notify your health care provider if you experience any of the following:  severe uncontrolled pain     Notify your health care provider if you experience any of the following:  difficulty breathing or increased cough     Notify your health care provider if you experience any of the following:  persistent dizziness, light-headedness, or visual disturbances     Notify your  health care provider if you experience any of the following:  increased confusion or weakness     Activity as tolerated         Pending Diagnostic Studies:     None         Medications:  Reconciled Home Medications:      Medication List      START taking these medications    acetaminophen 325 MG tablet  Commonly known as:  TYLENOL  Take 2 tablets (650 mg total) by mouth every 4 (four) hours as needed for Pain or Temperature greater than (100).     albuterol 90 mcg/actuation inhaler  Commonly known as:  VENTOLIN HFA  Inhale 2 puffs into the lungs every 6 (six) hours as needed for Wheezing or Shortness of Breath. Rescue     benzonatate 200 MG capsule  Commonly known as:  TESSALON  Take 1 capsule (200 mg total) by mouth 3 (three) times daily as needed for Cough.     levoFLOXacin 750 MG tablet  Commonly known as:  LEVAQUIN  Take 1 tablet (750 mg total) by mouth once daily. for 6 days     predniSONE 20 MG tablet  Commonly known as:  DELTASONE  Take 2 tablets (40 mg total) by mouth once daily. for 3 days            Indwelling Lines/Drains at time of discharge:   Lines/Drains/Airways     None                 Time spent on the discharge of patient: >30 minutes  Patient was seen and examined on the date of discharge and determined to be suitable for discharge.         Juan Madrigal MD  Department of Hospital Medicine  [facility]

## 2019-10-29 NOTE — HOSPITAL COURSE
48 y/o female presents with congestion.  Patient recently seen at urgent care and diagnosed with pneumonia.  Treated with Doxycycline with no improvement of symptoms.  She presented to ER where she was noted to have elevated lactic acid.  Patient also had episode of hypoxia and worsening shortness of breath.  Patient admitted and started on IV Levaquin with improvement of symptoms.  Patient with hx of mild intermittent asthma.  She was also started on nebs.  Patient has remained afebrile and hemodynamically stable.  Will discharged home on 6 more days of oral Levaquin.  Will give a short course of Prednisone upon discharge.  Patient to follow up with PCP.

## 2019-10-29 NOTE — PROGRESS NOTES
Follow-up Information     Tariq Vargas III, MD. Schedule an appointment as soon as possible for a visit on 10/30/2019.    Specialty:  Internal Medicine  Why:  Outpatient Services, hospital follow up appointment on Monday 9:30   Contact information:  [address]  [facility]  [place] [address]  [phone]                       [facility] CASE MANAGEMENT                  WRITTEN DISCHARGE INFORMATION      APPOINTMENTS AND RESOURCES TO HELP YOU MANAGE YOUR CARE AT HOME BASED ON YOUR PREFERENCES:  (If an appointment is not scheduled for you when you leave the hospital, call your doctor to schedule a follow up visit within a week)        Healthy Living Instructions to HELP MANAGE YOUR CARE AT HOME:  Things You are responsible for:  1.    Getting your prescriptions filled   2.    Taking your medications as directed, DO NOT MISS ANY DOSES!  3.    Following the diet and exercise recommended by your doctor  4.    Going to your follow-up doctor appointment. This is important because it allows the doctor to monitor your progress and determine if any changes need to made to your treatment plan.  5. If you have any questions about MANAGING YOUR CARE AT HOME Call the Nurse Care Line for 24/7 Assistance [phone]       Please answer any calls you may receive from [facility]. We want to continue to support you as you manage your healthcare needs. [facility] is happy to have the opportunity to serve you.      Thank you for choosing [facility] for your healthcare needs!  Your [facility] Case Management Team,    Bryce Prescott MSW    [phone]

## 2019-10-29 NOTE — PLAN OF CARE
"   10/29/19 1203   Final Note   Assessment Type Final Discharge Note   Anticipated Discharge Disposition Home   Hospital Follow Up  Appt(s) scheduled? Yes   Discharge plans and expectations educations in teach back method with documentation complete? Yes   Right Care Referral Info   Post Acute Recommendation No Care       EDUCATION:  Pt provided with educational information on Pneumonia.  Information reviewed and placed in :My Healthcare Packet" to be brought home for  use as resource after discharge.  Information included:  signs and symptoms to look for at discharge. SW instructed pt to call the doctor if experiencing symptoms that may indicate a medical emergency: CALL 911 if signs and symptoms worsen. SW asked pt to provide SW with two signs and symptoms recently discussed.  Pt stated, " I will pay attention to chills and high temperature ".     Reminded pt things he will be responsible for to manage his healthcare at home: getting Rx filled, attending follow up appointments, and taking medication as prescribed were discussed. Teach back method used.  All questions answered.  Patient verbalized understanding of all information.  SW notified pt's nurse, Flora, all CM needs have been met.  "

## 2019-10-29 NOTE — NURSING
Report given to oncoming nurse Benja RN, Patient is awake and alert. Bed is in lowest position, wheels locked, call light is in reach. 12 hour chart check completed

## 2019-11-01 LAB
BACTERIA BLD CULT: NORMAL
BACTERIA BLD CULT: NORMAL

## 2022-12-09 DIAGNOSIS — Z12.31 ENCOUNTER FOR SCREENING MAMMOGRAM FOR MALIGNANT NEOPLASM OF BREAST: Primary | ICD-10-CM
